# Patient Record
Sex: MALE | Race: BLACK OR AFRICAN AMERICAN | NOT HISPANIC OR LATINO | URBAN - METROPOLITAN AREA
[De-identification: names, ages, dates, MRNs, and addresses within clinical notes are randomized per-mention and may not be internally consistent; named-entity substitution may affect disease eponyms.]

---

## 2024-08-30 ENCOUNTER — OFFICE VISIT (OUTPATIENT)
Dept: URBAN - METROPOLITAN AREA CLINIC 113 | Facility: CLINIC | Age: 74
End: 2024-08-30

## 2024-08-30 ENCOUNTER — DASHBOARD ENCOUNTERS (OUTPATIENT)
Age: 74
End: 2024-08-30

## 2024-08-30 ENCOUNTER — LAB OUTSIDE AN ENCOUNTER (OUTPATIENT)
Dept: URBAN - METROPOLITAN AREA CLINIC 113 | Facility: CLINIC | Age: 74
End: 2024-08-30

## 2024-08-30 VITALS
DIASTOLIC BLOOD PRESSURE: 56 MMHG | TEMPERATURE: 97.7 F | RESPIRATION RATE: 20 BRPM | SYSTOLIC BLOOD PRESSURE: 145 MMHG | WEIGHT: 134.6 LBS | HEIGHT: 71 IN | HEART RATE: 67 BPM | BODY MASS INDEX: 18.84 KG/M2

## 2024-08-30 PROBLEM — 40739000: Status: ACTIVE | Noted: 2024-08-30

## 2024-08-30 RX ORDER — UBIDECARENONE 30 MG
AS DIRECTED CAPSULE ORAL
Status: ACTIVE | COMMUNITY

## 2024-08-30 RX ORDER — TAMSULOSIN HYDROCHLORIDE 0.4 MG/1
1 CAPSULE CAPSULE ORAL ONCE A DAY
Status: ACTIVE | COMMUNITY

## 2024-08-30 RX ORDER — FERROUS SULFATE TAB 325 MG (65 MG ELEMENTAL FE) 325 (65 FE) MG
1 TABLET TAB ORAL
Status: ACTIVE | COMMUNITY

## 2024-08-30 RX ORDER — SIMVASTATIN 40 MG/1
TABLET, FILM COATED ORAL
Refills: 0 | Status: ON HOLD | COMMUNITY
Start: 2013-05-06

## 2024-08-30 RX ORDER — OLMESARTAN MEDOXOMIL 40 MG/1
1 TABLET TABLET, FILM COATED ORAL ONCE A DAY
Status: ACTIVE | COMMUNITY

## 2024-08-30 RX ORDER — AMLODIPINE BESYLATE 10 MG
TAKE 1 TABLET DAILY TABLET ORAL
Refills: 0 | Status: ON HOLD | COMMUNITY

## 2024-08-30 RX ORDER — TRIAMTERENE AND HYDROCHLOROTHIAZIDE 37.5; 25 MG/1; MG/1
TABLET ORAL
Refills: 0 | Status: ON HOLD | COMMUNITY
Start: 2013-10-30

## 2024-08-30 RX ORDER — ASPIRIN 81 MG/1
1 TABLET TABLET, COATED ORAL ONCE A DAY
Status: ACTIVE | COMMUNITY

## 2024-08-30 RX ORDER — FLUTICASONE PROPIONATE 50 UG/1
SPRAY, METERED NASAL
Refills: 0 | Status: ACTIVE | COMMUNITY
Start: 2013-11-08

## 2024-08-30 RX ORDER — CYPROHEPTADINE HYDROCHLORIDE 4 MG/1
1 TABLET TABLET ORAL TWICE A DAY
Status: ACTIVE | COMMUNITY

## 2024-08-30 RX ORDER — CARVEDILOL 12.5 MG/1
1 TABLET WITH FOOD TABLET, FILM COATED ORAL TWICE A DAY
Status: ACTIVE | COMMUNITY

## 2024-08-30 RX ORDER — SIMVASTATIN 20 MG/1
TAKE 1 TABLET DAILY AT BEDTIME TABLET, FILM COATED ORAL
Refills: 0 | Status: ON HOLD | COMMUNITY

## 2024-08-30 RX ORDER — OMEPRAZOLE 20 MG/1
CAPSULE, DELAYED RELEASE ORAL
Refills: 0 | Status: ON HOLD | COMMUNITY
Start: 2013-11-08

## 2024-08-30 RX ORDER — AMLODIPINE BESYLATE 10 MG/1
TABLET ORAL
Refills: 0 | Status: ACTIVE | COMMUNITY
Start: 2013-06-04

## 2024-08-30 RX ORDER — CETIRIZINE HYDROCHLORIDE 10 MG/1
1 TABLET TABLET, FILM COATED ORAL ONCE A DAY
Status: ACTIVE | COMMUNITY

## 2024-08-30 RX ORDER — AZITHROMYCIN DIHYDRATE 250 MG/1
TABLET, FILM COATED ORAL
Refills: 0 | Status: ON HOLD | COMMUNITY
Start: 2013-05-13

## 2024-08-30 RX ORDER — ESOMEPRAZOLE MAGNESIUM 40 MG/1
1 CAPSULE CAPSULE, DELAYED RELEASE PELLETS ORAL ONCE A DAY
Status: ACTIVE | COMMUNITY

## 2024-08-30 RX ORDER — AMOXICILLIN 500 MG/1
CAPSULE ORAL
Refills: 0 | Status: ON HOLD | COMMUNITY
Start: 2013-09-13

## 2024-08-30 RX ORDER — TRIAMTERENE/HYDROCHLOROTHIAZID 37.5-25 MG
TAKE 1 CAPSULE DAILY CAPSULE ORAL
Refills: 0 | Status: ON HOLD | COMMUNITY

## 2024-08-30 RX ORDER — ATORVASTATIN CALCIUM 40 MG/1
1 TABLET TABLET, FILM COATED ORAL ONCE A DAY
Status: ACTIVE | COMMUNITY

## 2024-08-30 RX ORDER — CHOLECALCIFEROL (VITAMIN D3) 50 MCG
1 CAPSULE CAPSULE ORAL ONCE A DAY
Status: ACTIVE | COMMUNITY

## 2024-08-30 RX ORDER — DOCUSATE SODIUM 100 MG/1
1 CAPSULE AS NEEDED CAPSULE ORAL ONCE A DAY
Status: ACTIVE | COMMUNITY

## 2024-08-30 RX ORDER — OMEPRAZOLE 40 MG/1
TAKE 1 CAPSULE DAILY CAPSULE, DELAYED RELEASE ORAL
Qty: 30 | Refills: 4 | Status: ACTIVE | COMMUNITY

## 2024-08-30 RX ORDER — TRIAMTERENE AND HYDROCHLOROTHIAZIDE 37.5; 25 MG/1; MG/1
CAPSULE ORAL
Refills: 0 | Status: ON HOLD | COMMUNITY
Start: 2013-06-04

## 2024-08-30 RX ORDER — NAPROXEN 500 MG/1
TABLET, DELAYED RELEASE ORAL
Refills: 0 | Status: ON HOLD | COMMUNITY
Start: 2013-04-09

## 2024-08-30 NOTE — HPI-TODAY'S VISIT:
This is a 74-year-old male with a history of hypertension, hypercholesterolemia, and dysphagia status post EGD demonstrating normal esophagus/empiric dilation (May 2022) presenting for follow-up regarding difficulty swallowing. He was last seen in hospital consultation 7/31/2022 for evaluation of dysphagia.  He had undergone an upper endoscopy within 2 months which demonstrated no evidence of obstructing lesion.  He had no response to empiric dilation.  It was discussed that dysphagia may be associated with esophageal manometry.  He was withdrawn and affect was markedly blunted.  It was discussed that depression may be playing a role.  Speech therapy evaluation had been ordered.  Dysmotility was a consideration. He was also seen in hospital consultation in May 2022 for dysphagia.  Dr. Aiken was of the opinion there may be a component of dysmotility.  He recommended a barium swallow and esophageal manometry as an outpatient. Labs 8/26/2024:CBC: WBC 4.3, hemoglobin 12.8, MCV 85.6, platelet 216.  BMP normal with exception of potassium 3.3, chloride 93, CO2 35, BUN 5.  LFTs normal TB 1.0, ALP 75, ALT 21, AST 36.  HIV nonreactive. CT soft tissue neck 8/26/2024:No acute findings.  Specifically no significant wall thickening of the cervical and visualized thoracic esophagus.  No extraluminal gas within the visualized mediastinum. Colonoscopy 4/8/2024 (Dr. Fields):Fair prep, sigmoid diverticulosis, internal hemorrhoids, no specimens collected. CT angiogram aorta and by iliofemoral 1/12/2024:No evidence of significant arterial stenosis or occlusion involving the bilateral lower extremity arterial systems.  Exam limited.  Excessive subcutaneous edema involving the bilateral lower  extremities.  But there is flow distal to this region.  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  Findings likely reflect an infectious/inflammatory etiology.  Bilateral right greater than left inguinal hernias containing multiple loops of nonobstructed bowel.  Small hiatal hernia. He had a right inguinal hernia repair 6/27/2024. He is a poor historian.  He is frequently unable to provide details to ask questions. He continues to experience difficulty swallowing describing food lodging in his throat/base of his throat relieved with drinking water.  He denies heartburn or nausea. He reports a recent onset of diarrhea.  He admits to loose or watery bowel movements occurring during the day and at night.  He denies abdominal pain but reports "gripping" preceding a bowel movement.  He denies red blood per rectum or melena.  He is unable to relay the duration of symptoms or frequency of bowel movements.  He states he is unable to eat because of difficulty swallowing.  He is drinking soda frequently. Comparing his current weight with his weight at his primary care physician's office in January,

## 2024-10-20 ENCOUNTER — TELEPHONE ENCOUNTER (OUTPATIENT)
Dept: URBAN - METROPOLITAN AREA CLINIC 113 | Facility: CLINIC | Age: 74
End: 2024-10-20

## 2024-10-21 ENCOUNTER — LAB OUTSIDE AN ENCOUNTER (OUTPATIENT)
Dept: URBAN - METROPOLITAN AREA CLINIC 113 | Facility: CLINIC | Age: 74
End: 2024-10-21

## 2024-10-21 ENCOUNTER — OFFICE VISIT (OUTPATIENT)
Dept: URBAN - METROPOLITAN AREA CLINIC 113 | Facility: CLINIC | Age: 74
End: 2024-10-21
Payer: MEDICARE

## 2024-10-21 VITALS
BODY MASS INDEX: 18.56 KG/M2 | RESPIRATION RATE: 16 BRPM | HEART RATE: 88 BPM | DIASTOLIC BLOOD PRESSURE: 50 MMHG | TEMPERATURE: 97.9 F | SYSTOLIC BLOOD PRESSURE: 136 MMHG | HEIGHT: 71 IN | WEIGHT: 132.6 LBS

## 2024-10-21 DIAGNOSIS — R93.3 ABNORMAL BARIUM SWALLOW: ICD-10-CM

## 2024-10-21 DIAGNOSIS — R19.7 DIARRHEA, UNSPECIFIED TYPE: ICD-10-CM

## 2024-10-21 DIAGNOSIS — R13.14 PHARYNGOESOPHAGEAL DYSPHAGIA: ICD-10-CM

## 2024-10-21 DIAGNOSIS — T17.900A SILENT ASPIRATION, INITIAL ENCOUNTER: ICD-10-CM

## 2024-10-21 PROBLEM — 413585005: Status: ACTIVE | Noted: 2024-10-21

## 2024-10-21 PROBLEM — 168824004: Status: ACTIVE | Noted: 2024-10-21

## 2024-10-21 PROCEDURE — 99214 OFFICE O/P EST MOD 30 MIN: CPT | Performed by: NURSE PRACTITIONER

## 2024-10-21 RX ORDER — DOCUSATE SODIUM 100 MG/1
1 CAPSULE AS NEEDED CAPSULE ORAL ONCE A DAY
Status: ACTIVE | COMMUNITY

## 2024-10-21 RX ORDER — TRIAMTERENE/HYDROCHLOROTHIAZID 37.5-25 MG
TAKE 1 CAPSULE DAILY CAPSULE ORAL
Refills: 0 | Status: ON HOLD | COMMUNITY

## 2024-10-21 RX ORDER — NAPROXEN 500 MG/1
TABLET, DELAYED RELEASE ORAL
Refills: 0 | Status: ON HOLD | COMMUNITY
Start: 2013-04-09

## 2024-10-21 RX ORDER — TRIAMTERENE AND HYDROCHLOROTHIAZIDE 37.5; 25 MG/1; MG/1
TABLET ORAL
Refills: 0 | Status: ON HOLD | COMMUNITY
Start: 2013-10-30

## 2024-10-21 RX ORDER — AZITHROMYCIN DIHYDRATE 250 MG/1
TABLET, FILM COATED ORAL
Refills: 0 | Status: ON HOLD | COMMUNITY
Start: 2013-05-13

## 2024-10-21 RX ORDER — FLUTICASONE PROPIONATE 50 UG/1
SPRAY, METERED NASAL
Refills: 0 | Status: ACTIVE | COMMUNITY
Start: 2013-11-08

## 2024-10-21 RX ORDER — FERROUS SULFATE TAB 325 MG (65 MG ELEMENTAL FE) 325 (65 FE) MG
1 TABLET TAB ORAL
Status: ACTIVE | COMMUNITY

## 2024-10-21 RX ORDER — ASPIRIN 81 MG/1
1 TABLET TABLET, COATED ORAL ONCE A DAY
Status: ACTIVE | COMMUNITY

## 2024-10-21 RX ORDER — CARVEDILOL 12.5 MG/1
1 TABLET WITH FOOD TABLET, FILM COATED ORAL TWICE A DAY
Status: ACTIVE | COMMUNITY

## 2024-10-21 RX ORDER — TRIAMTERENE AND HYDROCHLOROTHIAZIDE 37.5; 25 MG/1; MG/1
CAPSULE ORAL
Refills: 0 | Status: ON HOLD | COMMUNITY
Start: 2013-06-04

## 2024-10-21 RX ORDER — CHOLECALCIFEROL (VITAMIN D3) 50 MCG
1 CAPSULE CAPSULE ORAL ONCE A DAY
Status: ACTIVE | COMMUNITY

## 2024-10-21 RX ORDER — OLMESARTAN MEDOXOMIL 40 MG/1
1 TABLET TABLET, FILM COATED ORAL ONCE A DAY
Status: ACTIVE | COMMUNITY

## 2024-10-21 RX ORDER — CETIRIZINE HYDROCHLORIDE 10 MG/1
1 TABLET TABLET, FILM COATED ORAL ONCE A DAY
Status: ACTIVE | COMMUNITY

## 2024-10-21 RX ORDER — ATORVASTATIN CALCIUM 40 MG/1
1 TABLET TABLET, FILM COATED ORAL ONCE A DAY
Status: ACTIVE | COMMUNITY

## 2024-10-21 RX ORDER — AMOXICILLIN 500 MG/1
CAPSULE ORAL
Refills: 0 | Status: ON HOLD | COMMUNITY
Start: 2013-09-13

## 2024-10-21 RX ORDER — OMEPRAZOLE 20 MG/1
CAPSULE, DELAYED RELEASE ORAL
Refills: 0 | Status: ON HOLD | COMMUNITY
Start: 2013-11-08

## 2024-10-21 RX ORDER — UBIDECARENONE 30 MG
AS DIRECTED CAPSULE ORAL
Status: ACTIVE | COMMUNITY

## 2024-10-21 RX ORDER — CYPROHEPTADINE HYDROCHLORIDE 4 MG/1
1 TABLET TABLET ORAL TWICE A DAY
Status: ACTIVE | COMMUNITY

## 2024-10-21 RX ORDER — TAMSULOSIN HYDROCHLORIDE 0.4 MG/1
1 CAPSULE CAPSULE ORAL ONCE A DAY
Status: ACTIVE | COMMUNITY

## 2024-10-21 RX ORDER — AMLODIPINE BESYLATE 10 MG
TAKE 1 TABLET DAILY TABLET ORAL
Refills: 0 | Status: ON HOLD | COMMUNITY

## 2024-10-21 RX ORDER — OMEPRAZOLE 40 MG/1
TAKE 1 CAPSULE DAILY CAPSULE, DELAYED RELEASE ORAL
Qty: 30 | Refills: 4 | Status: ACTIVE | COMMUNITY

## 2024-10-21 RX ORDER — AMLODIPINE BESYLATE 10 MG/1
TABLET ORAL
Refills: 0 | Status: ACTIVE | COMMUNITY
Start: 2013-06-04

## 2024-10-21 RX ORDER — ESOMEPRAZOLE MAGNESIUM 40 MG/1
1 CAPSULE CAPSULE, DELAYED RELEASE PELLETS ORAL ONCE A DAY
Status: ACTIVE | COMMUNITY

## 2024-10-21 RX ORDER — SIMVASTATIN 20 MG/1
TAKE 1 TABLET DAILY AT BEDTIME TABLET, FILM COATED ORAL
Refills: 0 | Status: ON HOLD | COMMUNITY

## 2024-10-21 RX ORDER — SIMVASTATIN 40 MG/1
TABLET, FILM COATED ORAL
Refills: 0 | Status: ON HOLD | COMMUNITY
Start: 2013-05-06

## 2024-10-21 NOTE — HPI-OTHER HISTORIES
Barium swallow 9/9/2024:Exam terminated prematurely due to patient silent aspiration. The cursory evaluation of the cervical and thoracic esophagus demonstrated no gross abnormalities. (Patulous esophagus without obvious contouring thoracic esophagus). No hiatal hernia. 13 mm barium pill passed into the stomach without difficulty.  Labs  8/26/2024:CBC: WBC 4.3, hemoglobin 12.8, MCV 85.6, platelet 216. BMP normal with exception of potassium 3.3, chloride 93, CO2 35, BUN 5. LFTs normal TB 1.0, ALP 75, ALT 21, AST 36. HIV nonreactive.  CT soft tissue neck 8/26/2024:No acute findings. Specifically no significant wall thickening of the cervical and visualized thoracic esophagus. No extraluminal gas within the visualized mediastinum.  Colonoscopy 4/8/2024 (Dr. Fields):Fair prep, sigmoid diverticulosis, internal hemorrhoids, no specimens collected.  CT angiogram aorta and by iliofemoral 1/12/2024:No evidence of significant arterial stenosis or occlusion involving the bilateral lower extremity arterial systems. Exam limited. Excessive subcutaneous edema involving the bilateral lower extremities. But there is flow distal to this region. 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 Findings likely reflect an infectious/inflammatory etiology. Bilateral right greater than left inguinal hernias containing multiple loops of nonobstructed bowel. Small hiatal hernia.

## 2024-10-21 NOTE — HPI-TODAY'S VISIT:
This is a 74-year-old male with a history of hypertension, hypercholesterolemia, dysphagia status post EGD demonstrating normal esophagus/empiric dilation (2022), and diarrhea presenting for follow-up.  He reported chronic pharyngeal dysphagia.  He had experienced persistent symptoms since empiric dilation in 2022.  He was seen twice in hospital consultation shortly after his EGD.  In the hospital, he was able to eat and drink.  It was discussed that he may have a motility disorder of the esophagus.  Barium swallow and esophageal manometry were recommended as an outpatient.  Barium swallow was ordered and esophageal manometry was a future consideration.  He reported diarrhea.  Symptoms appear to be acute although he could not relate the duration of his symptoms.  Stool studies were recommended to assess for an infectious process.  In the interim, he was to begin daily fiber and use Pepto-Bismol as needed.  Imodium was a consideration if stool studies returned negative.  If symptoms persisted, repeat colonoscopy was a consideration. There were no results from stool studies and no results from barium swallow.  He continues to report difficulty swallowing.  He has problems swallowing larger pills.  He feels as though material is lodging in his throat.  He is taking esomeprazole or omeprazole 40 mg daily.  1 of these capsules is smaller and he is able to swallow it.  He denies heartburn, regurgitation, or abdominal pain.  He continues to report more than 2 watery bowel movements per day.  He has intermittently taking 1 sleeve of Benefiber daily.  He denies red blood per rectum or other abdominal symptoms. He reports his throat feels swollen and tight.  He also feels as though he may have a fever. He is lost 2 pounds since his last visit.  He is drinking 2 or 3 cans of boost daily.

## 2024-12-09 ENCOUNTER — OFFICE VISIT (OUTPATIENT)
Dept: URBAN - METROPOLITAN AREA CLINIC 113 | Facility: CLINIC | Age: 74
End: 2024-12-09

## 2024-12-09 RX ORDER — TRIAMTERENE/HYDROCHLOROTHIAZID 37.5-25 MG
TAKE 1 CAPSULE DAILY CAPSULE ORAL
Refills: 0 | Status: ON HOLD | COMMUNITY

## 2024-12-09 RX ORDER — OLMESARTAN MEDOXOMIL 40 MG/1
1 TABLET TABLET, FILM COATED ORAL ONCE A DAY
Status: ACTIVE | COMMUNITY

## 2024-12-09 RX ORDER — SIMVASTATIN 20 MG/1
TAKE 1 TABLET DAILY AT BEDTIME TABLET, FILM COATED ORAL
Refills: 0 | Status: ON HOLD | COMMUNITY

## 2024-12-09 RX ORDER — TAMSULOSIN HYDROCHLORIDE 0.4 MG/1
1 CAPSULE CAPSULE ORAL ONCE A DAY
Status: ACTIVE | COMMUNITY

## 2024-12-09 RX ORDER — ESOMEPRAZOLE MAGNESIUM 40 MG/1
1 CAPSULE CAPSULE, DELAYED RELEASE PELLETS ORAL ONCE A DAY
Status: ACTIVE | COMMUNITY

## 2024-12-09 RX ORDER — FERROUS SULFATE TAB 325 MG (65 MG ELEMENTAL FE) 325 (65 FE) MG
1 TABLET TAB ORAL
Status: ACTIVE | COMMUNITY

## 2024-12-09 RX ORDER — ASPIRIN 81 MG/1
1 TABLET TABLET, COATED ORAL ONCE A DAY
Status: ACTIVE | COMMUNITY

## 2024-12-09 RX ORDER — DOCUSATE SODIUM 100 MG/1
1 CAPSULE AS NEEDED CAPSULE ORAL ONCE A DAY
Status: ACTIVE | COMMUNITY

## 2024-12-09 RX ORDER — AMLODIPINE BESYLATE 10 MG/1
TABLET ORAL
Refills: 0 | Status: ACTIVE | COMMUNITY
Start: 2013-06-04

## 2024-12-09 RX ORDER — NAPROXEN 500 MG/1
TABLET, DELAYED RELEASE ORAL
Refills: 0 | Status: ON HOLD | COMMUNITY
Start: 2013-04-09

## 2024-12-09 RX ORDER — AZITHROMYCIN DIHYDRATE 250 MG/1
TABLET, FILM COATED ORAL
Refills: 0 | Status: ON HOLD | COMMUNITY
Start: 2013-05-13

## 2024-12-09 RX ORDER — OMEPRAZOLE 20 MG/1
CAPSULE, DELAYED RELEASE ORAL
Refills: 0 | Status: ON HOLD | COMMUNITY
Start: 2013-11-08

## 2024-12-09 RX ORDER — CARVEDILOL 12.5 MG/1
1 TABLET WITH FOOD TABLET, FILM COATED ORAL TWICE A DAY
Status: ACTIVE | COMMUNITY

## 2024-12-09 RX ORDER — OMEPRAZOLE 40 MG/1
TAKE 1 CAPSULE DAILY CAPSULE, DELAYED RELEASE ORAL
Qty: 30 | Refills: 4 | Status: ACTIVE | COMMUNITY

## 2024-12-09 RX ORDER — ATORVASTATIN CALCIUM 40 MG/1
1 TABLET TABLET, FILM COATED ORAL ONCE A DAY
Status: ACTIVE | COMMUNITY

## 2024-12-09 RX ORDER — TRIAMTERENE AND HYDROCHLOROTHIAZIDE 37.5; 25 MG/1; MG/1
TABLET ORAL
Refills: 0 | Status: ON HOLD | COMMUNITY
Start: 2013-10-30

## 2024-12-09 RX ORDER — AMOXICILLIN 500 MG/1
CAPSULE ORAL
Refills: 0 | Status: ON HOLD | COMMUNITY
Start: 2013-09-13

## 2024-12-09 RX ORDER — UBIDECARENONE 30 MG
AS DIRECTED CAPSULE ORAL
Status: ACTIVE | COMMUNITY

## 2024-12-09 RX ORDER — CETIRIZINE HYDROCHLORIDE 10 MG/1
1 TABLET TABLET, FILM COATED ORAL ONCE A DAY
Status: ACTIVE | COMMUNITY

## 2024-12-09 RX ORDER — AMLODIPINE BESYLATE 10 MG
TAKE 1 TABLET DAILY TABLET ORAL
Refills: 0 | Status: ON HOLD | COMMUNITY

## 2024-12-09 RX ORDER — SIMVASTATIN 40 MG/1
TABLET, FILM COATED ORAL
Refills: 0 | Status: ON HOLD | COMMUNITY
Start: 2013-05-06

## 2024-12-09 RX ORDER — TRIAMTERENE AND HYDROCHLOROTHIAZIDE 37.5; 25 MG/1; MG/1
CAPSULE ORAL
Refills: 0 | Status: ON HOLD | COMMUNITY
Start: 2013-06-04

## 2024-12-09 RX ORDER — CHOLECALCIFEROL (VITAMIN D3) 50 MCG
1 CAPSULE CAPSULE ORAL ONCE A DAY
Status: ACTIVE | COMMUNITY

## 2024-12-09 RX ORDER — CYPROHEPTADINE HYDROCHLORIDE 4 MG/1
1 TABLET TABLET ORAL TWICE A DAY
Status: ACTIVE | COMMUNITY

## 2024-12-09 RX ORDER — FLUTICASONE PROPIONATE 50 UG/1
SPRAY, METERED NASAL
Refills: 0 | Status: ACTIVE | COMMUNITY
Start: 2013-11-08

## 2024-12-09 NOTE — HPI-TODAY'S VISIT:
This is a 74-year-old male with a history of hypertension, hypercholesterolemia, dysphagia status post EGD demonstrating normal esophagus/empiric dilation (2022), and diarrhea presenting for follow-up. He was last seen 10/21/2024.  He was taking esomeprazole 40 mg or omeprazole 40 mg daily for acid reflux.  He denied heartburn or regurgitation.  He continues to report pharyngeal dysphagia.  Barium swallow had been aborted prior to completion showing silent aspiration.  A barium tablet passed through the esophagus without incident and there was mention of a patulous thoracic esophagus.  Discussed with Dr. Aiken who recommended a modified barium swallow with speech to assess for aspiration and esophageal manometry.  He continues to report more than 2 watery bowel movements per day.  He had been taking 1 sleeve of Benefiber intermittently.  At a previous visit, stool studies were ordered but had not been completed.  He was to complete stool studies and increase Benefiber to 2 sleeves every day. Labs 11/17/2024:CBC: WBC 3.6, hemoglobin 13.2, MCV 84.6, platelet 245.  BMP normal with exception of glucose 73, sodium 133, chloride 91.22, CO2 38, BUN 22.  LFTs normal TB 0.6, ALP 90, ALT 24, AST 30.  PT 12.7, INR 1.13.  PTT 42.6.  proBNP 244.  Magnesium, phosphorus, and troponin normal/negative. There are no results for esophageal manometry or modified barium swallow.  It does not appear as though orders have been faxed.

## 2025-01-16 ENCOUNTER — OFFICE VISIT (OUTPATIENT)
Dept: URBAN - METROPOLITAN AREA CLINIC 113 | Facility: CLINIC | Age: 75
End: 2025-01-16
Payer: MEDICARE

## 2025-01-16 VITALS
TEMPERATURE: 97.5 F | WEIGHT: 140 LBS | DIASTOLIC BLOOD PRESSURE: 46 MMHG | BODY MASS INDEX: 19.6 KG/M2 | SYSTOLIC BLOOD PRESSURE: 144 MMHG | HEART RATE: 83 BPM | HEIGHT: 71 IN

## 2025-01-16 DIAGNOSIS — K21.9 GERD WITHOUT ESOPHAGITIS: ICD-10-CM

## 2025-01-16 DIAGNOSIS — T17.900A SILENT ASPIRATION, INITIAL ENCOUNTER: ICD-10-CM

## 2025-01-16 DIAGNOSIS — R93.3 ABNORMAL BARIUM SWALLOW: ICD-10-CM

## 2025-01-16 DIAGNOSIS — R13.14 PHARYNGOESOPHAGEAL DYSPHAGIA: ICD-10-CM

## 2025-01-16 PROCEDURE — 99213 OFFICE O/P EST LOW 20 MIN: CPT | Performed by: NURSE PRACTITIONER

## 2025-01-16 RX ORDER — TRIAMTERENE/HYDROCHLOROTHIAZID 37.5-25 MG
TAKE 1 CAPSULE DAILY CAPSULE ORAL
Refills: 0 | Status: ON HOLD | COMMUNITY

## 2025-01-16 RX ORDER — FERROUS SULFATE TAB 325 MG (65 MG ELEMENTAL FE) 325 (65 FE) MG
1 TABLET TAB ORAL
Status: ACTIVE | COMMUNITY

## 2025-01-16 RX ORDER — OMEPRAZOLE 20 MG/1
CAPSULE, DELAYED RELEASE ORAL
Refills: 0 | Status: ON HOLD | COMMUNITY
Start: 2013-11-08

## 2025-01-16 RX ORDER — AZITHROMYCIN DIHYDRATE 250 MG/1
TABLET, FILM COATED ORAL
Refills: 0 | Status: ON HOLD | COMMUNITY
Start: 2013-05-13

## 2025-01-16 RX ORDER — ESOMEPRAZOLE MAGNESIUM 40 MG/1
1 CAPSULE CAPSULE, DELAYED RELEASE PELLETS ORAL ONCE A DAY
Status: ON HOLD | COMMUNITY

## 2025-01-16 RX ORDER — TRIAMTERENE AND HYDROCHLOROTHIAZIDE 37.5; 25 MG/1; MG/1
CAPSULE ORAL
Refills: 0 | Status: ON HOLD | COMMUNITY
Start: 2013-06-04

## 2025-01-16 RX ORDER — ASPIRIN 81 MG/1
1 TABLET TABLET, COATED ORAL ONCE A DAY
Status: ACTIVE | COMMUNITY

## 2025-01-16 RX ORDER — NAPROXEN 500 MG/1
TABLET, DELAYED RELEASE ORAL
Refills: 0 | Status: ON HOLD | COMMUNITY
Start: 2013-04-09

## 2025-01-16 RX ORDER — OMEPRAZOLE 40 MG/1
TAKE 1 CAPSULE DAILY CAPSULE, DELAYED RELEASE ORAL
Qty: 30 | Refills: 4 | Status: ACTIVE | COMMUNITY

## 2025-01-16 RX ORDER — ATORVASTATIN CALCIUM 40 MG/1
1 TABLET TABLET, FILM COATED ORAL ONCE A DAY
Status: ACTIVE | COMMUNITY

## 2025-01-16 RX ORDER — CHOLECALCIFEROL (VITAMIN D3) 50 MCG
1 CAPSULE CAPSULE ORAL ONCE A DAY
Status: ACTIVE | COMMUNITY

## 2025-01-16 RX ORDER — FLUTICASONE PROPIONATE 50 UG/1
SPRAY, METERED NASAL
Refills: 0 | Status: ACTIVE | COMMUNITY
Start: 2013-11-08

## 2025-01-16 RX ORDER — AMLODIPINE BESYLATE 10 MG/1
TABLET ORAL
Refills: 0 | Status: ACTIVE | COMMUNITY
Start: 2013-06-04

## 2025-01-16 RX ORDER — AMOXICILLIN 500 MG/1
CAPSULE ORAL
Refills: 0 | Status: ON HOLD | COMMUNITY
Start: 2013-09-13

## 2025-01-16 RX ORDER — CETIRIZINE HYDROCHLORIDE 10 MG/1
1 TABLET TABLET, FILM COATED ORAL ONCE A DAY
Status: ACTIVE | COMMUNITY

## 2025-01-16 RX ORDER — DOCUSATE SODIUM 100 MG/1
1 CAPSULE AS NEEDED CAPSULE ORAL ONCE A DAY
Status: ACTIVE | COMMUNITY

## 2025-01-16 RX ORDER — AMLODIPINE BESYLATE 10 MG
TAKE 1 TABLET DAILY TABLET ORAL
Refills: 0 | Status: ON HOLD | COMMUNITY

## 2025-01-16 RX ORDER — UBIDECARENONE 30 MG
AS DIRECTED CAPSULE ORAL
Status: ACTIVE | COMMUNITY

## 2025-01-16 RX ORDER — CARVEDILOL 12.5 MG/1
1 TABLET WITH FOOD TABLET, FILM COATED ORAL TWICE A DAY
Status: ACTIVE | COMMUNITY

## 2025-01-16 RX ORDER — OLMESARTAN MEDOXOMIL 40 MG/1
1 TABLET TABLET, FILM COATED ORAL ONCE A DAY
Status: ACTIVE | COMMUNITY

## 2025-01-16 RX ORDER — TRIAMTERENE AND HYDROCHLOROTHIAZIDE 37.5; 25 MG/1; MG/1
TABLET ORAL
Refills: 0 | Status: ON HOLD | COMMUNITY
Start: 2013-10-30

## 2025-01-16 RX ORDER — TAMSULOSIN HYDROCHLORIDE 0.4 MG/1
1 CAPSULE CAPSULE ORAL ONCE A DAY
Status: ACTIVE | COMMUNITY

## 2025-01-16 RX ORDER — SIMVASTATIN 40 MG/1
TABLET, FILM COATED ORAL
Refills: 0 | Status: ON HOLD | COMMUNITY
Start: 2013-05-06

## 2025-01-16 RX ORDER — CYPROHEPTADINE HYDROCHLORIDE 4 MG/1
1 TABLET TABLET ORAL TWICE A DAY
Status: ACTIVE | COMMUNITY

## 2025-01-16 RX ORDER — SIMVASTATIN 20 MG/1
TAKE 1 TABLET DAILY AT BEDTIME TABLET, FILM COATED ORAL
Refills: 0 | Status: ON HOLD | COMMUNITY

## 2025-01-16 NOTE — PHYSICAL EXAM EYES:
Conjunctivae and eyelids appear normal,  Sclerae : no icterus Cerebrovascular accident (CVA) due to stenosis of left middle cerebral artery    Controlled type 2 diabetes mellitus without complication, unspecified long term insulin use status    Coronary artery disease without angina pectoris, unspecified vessel or lesion type, unspecified whether native or transplanted heart    Depression, unspecified depression type    Essential hypertension    High blood cholesterol

## 2025-01-16 NOTE — HPI-OTHER HISTORIES
Labs 11/17/2024:CBC: WBC 3.6, hemoglobin 13.2, MCV 84.6, platelet 245. BMP normal with exception of glucose 73, sodium 133, chloride 91.22, CO2 38, BUN 22. LFTs normal TB 0.6, ALP 90, ALT 24, AST 30. PT 12.7, INR 1.13. PTT 42.6. proBNP 244. Magnesium, phosphorus, and troponin normal/negative.  Barium swallow 9/9/2024:Exam terminated prematurely due to patient silent aspiration. The cursory evaluation of the cervical and thoracic esophagus demonstrated no gross abnormalities. (Patulous esophagus without obvious contouring thoracic esophagus). No hiatal hernia. 13 mm barium pill passed into the stomach without difficulty.  Labs  8/26/2024:CBC: WBC 4.3, hemoglobin 12.8, MCV 85.6, platelet 216. BMP normal with exception of potassium 3.3, chloride 93, CO2 35, BUN 5. LFTs normal TB 1.0, ALP 75, ALT 21, AST 36. HIV nonreactive.  CT soft tissue neck 8/26/2024:No acute findings. Specifically no significant wall thickening of the cervical and visualized thoracic esophagus. No extraluminal gas within the visualized mediastinum.  Colonoscopy 4/8/2024 (Dr. Fields):Fair prep, sigmoid diverticulosis, internal hemorrhoids, no specimens collected.  CT angiogram aorta and by iliofemoral 1/12/2024:No evidence of significant arterial stenosis or occlusion involving the bilateral lower extremity arterial systems. Exam limited. Excessive subcutaneous edema involving the bilateral lower extremities. But there is flow distal to this region. 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 Findings likely reflect an infectious/inflammatory etiology. Bilateral right greater than left inguinal hernias containing multiple loops of nonobstructed bowel. Small hiatal hernia.

## 2025-01-16 NOTE — HPI-TODAY'S VISIT:
This is a 74-year-old male with a history of hypertension, hypercholesterolemia, dysphagia status post EGD demonstrating normal esophagus/empiric dilation (2022), and diarrhea presenting for follow-up. He was last seen 10/21/2024.  He was taking esomeprazole 40 mg or omeprazole 40 mg daily for acid reflux.  He denied heartburn or regurgitation.  He continues to report pharyngeal dysphagia.  Barium swallow had been aborted prior to completion showing silent aspiration.  A barium tablet passed through the esophagus without incident and there was mention of a patulous thoracic esophagus.  Discussed with Dr. Aiken who recommended a modified barium swallow with speech to assess for aspiration and esophageal manometry.  He continues to report more than 2 watery bowel movements per day.  He had been taking 1 sleeve of Benefiber intermittently.  At a previous visit, stool studies were ordered but had not been completed.  He was to complete stool studies and increase Benefiber to 2 sleeves every day. There are no results for esophageal manometry or modified barium swallow.  It does not appear as though orders have been faxed. He reports persistent difficulty swallowing describing solid food lodging in the area at the base of his neck.  He denies choking or coughing when swallowing.  He is not eating food but is drinking Ensure.  He denies heartburn, abdominal pain, or nausea.  He has a bowel movement most days.  He occasionally requires Colace for constipation.  He denies other abdominal symptoms.  He has gained 7-1/2 pounds since his last visit. His great niece is accompanying him.  She assists with his history and reports recent labs showing diabetes and kidney disease.

## 2025-01-20 PROBLEM — 266435005: Status: ACTIVE | Noted: 2025-01-20

## 2025-03-25 ENCOUNTER — TELEPHONE ENCOUNTER (OUTPATIENT)
Dept: URBAN - METROPOLITAN AREA CLINIC 5 | Facility: CLINIC | Age: 75
End: 2025-03-25

## 2025-04-03 ENCOUNTER — OFFICE VISIT (OUTPATIENT)
Dept: URBAN - METROPOLITAN AREA CLINIC 113 | Facility: CLINIC | Age: 75
End: 2025-04-03

## 2025-04-03 RX ORDER — OMEPRAZOLE 40 MG/1
TAKE 1 CAPSULE DAILY CAPSULE, DELAYED RELEASE ORAL
Qty: 30 | Refills: 4 | Status: ACTIVE | COMMUNITY

## 2025-04-03 RX ORDER — ATORVASTATIN CALCIUM 40 MG/1
1 TABLET TABLET, FILM COATED ORAL ONCE A DAY
Status: ACTIVE | COMMUNITY

## 2025-04-03 RX ORDER — AZITHROMYCIN DIHYDRATE 250 MG/1
TABLET, FILM COATED ORAL
Refills: 0 | Status: ON HOLD | COMMUNITY
Start: 2013-05-13

## 2025-04-03 RX ORDER — TRIAMTERENE AND HYDROCHLOROTHIAZIDE 37.5; 25 MG/1; MG/1
TABLET ORAL
Refills: 0 | Status: ON HOLD | COMMUNITY
Start: 2013-10-30

## 2025-04-03 RX ORDER — SIMVASTATIN 20 MG/1
TAKE 1 TABLET DAILY AT BEDTIME TABLET, FILM COATED ORAL
Refills: 0 | Status: ON HOLD | COMMUNITY

## 2025-04-03 RX ORDER — CYPROHEPTADINE HYDROCHLORIDE 4 MG/1
1 TABLET TABLET ORAL TWICE A DAY
Status: ACTIVE | COMMUNITY

## 2025-04-03 RX ORDER — SIMVASTATIN 40 MG/1
TABLET, FILM COATED ORAL
Refills: 0 | Status: ON HOLD | COMMUNITY
Start: 2013-05-06

## 2025-04-03 RX ORDER — OMEPRAZOLE 20 MG/1
CAPSULE, DELAYED RELEASE ORAL
Refills: 0 | Status: ON HOLD | COMMUNITY
Start: 2013-11-08

## 2025-04-03 RX ORDER — CETIRIZINE HYDROCHLORIDE 10 MG/1
1 TABLET TABLET, FILM COATED ORAL ONCE A DAY
Status: ACTIVE | COMMUNITY

## 2025-04-03 RX ORDER — UBIDECARENONE 30 MG
AS DIRECTED CAPSULE ORAL
Status: ACTIVE | COMMUNITY

## 2025-04-03 RX ORDER — CHOLECALCIFEROL (VITAMIN D3) 50 MCG
1 CAPSULE CAPSULE ORAL ONCE A DAY
Status: ACTIVE | COMMUNITY

## 2025-04-03 RX ORDER — AMLODIPINE BESYLATE 10 MG/1
TABLET ORAL
Refills: 0 | Status: ACTIVE | COMMUNITY
Start: 2013-06-04

## 2025-04-03 RX ORDER — OLMESARTAN MEDOXOMIL 40 MG/1
1 TABLET TABLET, FILM COATED ORAL ONCE A DAY
Status: ACTIVE | COMMUNITY

## 2025-04-03 RX ORDER — TRIAMTERENE/HYDROCHLOROTHIAZID 37.5-25 MG
TAKE 1 CAPSULE DAILY CAPSULE ORAL
Refills: 0 | Status: ON HOLD | COMMUNITY

## 2025-04-03 RX ORDER — TRIAMTERENE AND HYDROCHLOROTHIAZIDE 37.5; 25 MG/1; MG/1
CAPSULE ORAL
Refills: 0 | Status: ON HOLD | COMMUNITY
Start: 2013-06-04

## 2025-04-03 RX ORDER — ASPIRIN 81 MG/1
1 TABLET TABLET, COATED ORAL ONCE A DAY
Status: ACTIVE | COMMUNITY

## 2025-04-03 RX ORDER — AMOXICILLIN 500 MG/1
CAPSULE ORAL
Refills: 0 | Status: ON HOLD | COMMUNITY
Start: 2013-09-13

## 2025-04-03 RX ORDER — AMLODIPINE BESYLATE 10 MG
TAKE 1 TABLET DAILY TABLET ORAL
Refills: 0 | Status: ON HOLD | COMMUNITY

## 2025-04-03 RX ORDER — TAMSULOSIN HYDROCHLORIDE 0.4 MG/1
1 CAPSULE CAPSULE ORAL ONCE A DAY
Status: ACTIVE | COMMUNITY

## 2025-04-03 RX ORDER — ESOMEPRAZOLE MAGNESIUM 40 MG/1
1 CAPSULE CAPSULE, DELAYED RELEASE PELLETS ORAL ONCE A DAY
Status: ON HOLD | COMMUNITY

## 2025-04-03 RX ORDER — FERROUS SULFATE TAB 325 MG (65 MG ELEMENTAL FE) 325 (65 FE) MG
1 TABLET TAB ORAL
Status: ACTIVE | COMMUNITY

## 2025-04-03 RX ORDER — FLUTICASONE PROPIONATE 50 UG/1
SPRAY, METERED NASAL
Refills: 0 | Status: ACTIVE | COMMUNITY
Start: 2013-11-08

## 2025-04-03 RX ORDER — CARVEDILOL 12.5 MG/1
1 TABLET WITH FOOD TABLET, FILM COATED ORAL TWICE A DAY
Status: ACTIVE | COMMUNITY

## 2025-04-03 RX ORDER — NAPROXEN 500 MG/1
TABLET, DELAYED RELEASE ORAL
Refills: 0 | Status: ON HOLD | COMMUNITY
Start: 2013-04-09

## 2025-04-03 RX ORDER — DOCUSATE SODIUM 100 MG/1
1 CAPSULE AS NEEDED CAPSULE ORAL ONCE A DAY
Status: ACTIVE | COMMUNITY

## 2025-04-03 NOTE — HPI-TODAY'S VISIT:
This is a 74-year-old male with a history of hypertension, hypercholesterolemia, dysphagia status post EGD demonstrating normal esophagus/empiric dilation (2022), and diarrhea presenting for follow-up.  He was last seen here on 1/16/25.  He was last seen 10/21/2024.  He was taking esomeprazole 40 mg or omeprazole 40 mg daily for acid reflux.  He denied heartburn or regurgitation.  He continues to report pharyngeal dysphagia.  Barium swallow had been aborted prior to completion showing silent aspiration.  A barium tablet passed through the esophagus without incident and there was mention of a patulous thoracic esophagus.  Discussed with Dr. Aiken who recommended a modified barium swallow with speech to assess for aspiration and esophageal manometry.  He continues to report more than 2 watery bowel movements per day.  He had been taking 1 sleeve of Benefiber intermittently.  At a previous visit, stool studies were ordered but had not been completed.  He was to complete stool studies and increase Benefiber to 2 sleeves every day. There are no results for esophageal manometry or modified barium swallow.  It does not appear as though orders have been faxed.  He reports persistent difficulty swallowing describing solid food lodging in the area at the base of his neck.  He denies choking or coughing when swallowing.  He is not eating food but is drinking Ensure.  He denies heartburn, abdominal pain, or nausea.  He has a bowel movement most days.  He occasionally requires Colace for constipation.  He denies other abdominal symptoms.  He has gained 7-1/2 pounds since his last visit.  His great niece is accompanying him.  She assists with his history and reports recent labs showing diabetes and kidney disease.

## 2025-06-11 ENCOUNTER — LAB OUTSIDE AN ENCOUNTER (OUTPATIENT)
Dept: URBAN - METROPOLITAN AREA CLINIC 113 | Facility: CLINIC | Age: 75
End: 2025-06-11

## 2025-06-11 ENCOUNTER — TELEPHONE ENCOUNTER (OUTPATIENT)
Dept: URBAN - METROPOLITAN AREA CLINIC 113 | Facility: CLINIC | Age: 75
End: 2025-06-11

## 2025-06-11 ENCOUNTER — OFFICE VISIT (OUTPATIENT)
Dept: URBAN - METROPOLITAN AREA CLINIC 113 | Facility: CLINIC | Age: 75
End: 2025-06-11
Payer: MEDICARE

## 2025-06-11 DIAGNOSIS — K21.9 GERD WITHOUT ESOPHAGITIS: ICD-10-CM

## 2025-06-11 DIAGNOSIS — T17.900A SILENT ASPIRATION, INITIAL ENCOUNTER: ICD-10-CM

## 2025-06-11 DIAGNOSIS — B37.0 THRUSH, ORAL: ICD-10-CM

## 2025-06-11 DIAGNOSIS — R19.7 ACUTE DIARRHEA: ICD-10-CM

## 2025-06-11 DIAGNOSIS — R93.3 ABNORMAL BARIUM SWALLOW: ICD-10-CM

## 2025-06-11 DIAGNOSIS — R13.14 PHARYNGOESOPHAGEAL DYSPHAGIA: ICD-10-CM

## 2025-06-11 PROCEDURE — 99214 OFFICE O/P EST MOD 30 MIN: CPT | Performed by: INTERNAL MEDICINE

## 2025-06-11 RX ORDER — AMLODIPINE BESYLATE 10 MG/1
TABLET ORAL
Refills: 0 | Status: ON HOLD | COMMUNITY
Start: 2013-06-04

## 2025-06-11 RX ORDER — ESOMEPRAZOLE MAGNESIUM 40 MG/1
1 CAPSULE CAPSULE, DELAYED RELEASE PELLETS ORAL ONCE A DAY
Status: ON HOLD | COMMUNITY

## 2025-06-11 RX ORDER — TRIAMTERENE AND HYDROCHLOROTHIAZIDE 37.5; 25 MG/1; MG/1
TABLET ORAL
Refills: 0 | Status: ON HOLD | COMMUNITY
Start: 2013-10-30

## 2025-06-11 RX ORDER — FLUTICASONE PROPIONATE 50 UG/1
SPRAY, METERED NASAL
Refills: 0 | Status: ACTIVE | COMMUNITY
Start: 2013-11-08

## 2025-06-11 RX ORDER — OMEPRAZOLE 40 MG/1
TAKE 1 CAPSULE DAILY CAPSULE, DELAYED RELEASE ORAL
Qty: 30 | Refills: 4 | Status: ACTIVE | COMMUNITY

## 2025-06-11 RX ORDER — ATORVASTATIN CALCIUM 40 MG/1
1 TABLET TABLET, FILM COATED ORAL ONCE A DAY
Status: ACTIVE | COMMUNITY

## 2025-06-11 RX ORDER — FERROUS SULFATE TAB 325 MG (65 MG ELEMENTAL FE) 325 (65 FE) MG
1 TABLET TAB ORAL
Status: ACTIVE | COMMUNITY

## 2025-06-11 RX ORDER — CHOLECALCIFEROL (VITAMIN D3) 50 MCG
1 CAPSULE CAPSULE ORAL ONCE A DAY
Status: ON HOLD | COMMUNITY

## 2025-06-11 RX ORDER — CYPROHEPTADINE HYDROCHLORIDE 4 MG/1
1 TABLET TABLET ORAL TWICE A DAY
Status: ACTIVE | COMMUNITY

## 2025-06-11 RX ORDER — OMEPRAZOLE 20 MG/1
CAPSULE, DELAYED RELEASE ORAL
Refills: 0 | Status: ON HOLD | COMMUNITY
Start: 2013-11-08

## 2025-06-11 RX ORDER — QUETIAPINE 50 MG/1
1 TABLET AT BEDTIME TABLET, FILM COATED ORAL DAILY
Status: ACTIVE | COMMUNITY

## 2025-06-11 RX ORDER — AZITHROMYCIN DIHYDRATE 250 MG/1
TABLET, FILM COATED ORAL
Refills: 0 | Status: ON HOLD | COMMUNITY
Start: 2013-05-13

## 2025-06-11 RX ORDER — ASPIRIN 81 MG/1
1 TABLET TABLET, COATED ORAL ONCE A DAY
Status: ON HOLD | COMMUNITY

## 2025-06-11 RX ORDER — SIMVASTATIN 40 MG/1
TABLET, FILM COATED ORAL
Refills: 0 | Status: ON HOLD | COMMUNITY
Start: 2013-05-06

## 2025-06-11 RX ORDER — AMOXICILLIN 500 MG/1
CAPSULE ORAL
Refills: 0 | Status: ON HOLD | COMMUNITY
Start: 2013-09-13

## 2025-06-11 RX ORDER — CETIRIZINE HYDROCHLORIDE 10 MG/1
1 TABLET TABLET, FILM COATED ORAL ONCE A DAY
Status: ON HOLD | COMMUNITY

## 2025-06-11 RX ORDER — TRIAMTERENE AND HYDROCHLOROTHIAZIDE 37.5; 25 MG/1; MG/1
CAPSULE ORAL
Refills: 0 | Status: ON HOLD | COMMUNITY
Start: 2013-06-04

## 2025-06-11 RX ORDER — TRIAMTERENE/HYDROCHLOROTHIAZID 37.5-25 MG
TAKE 1 CAPSULE DAILY CAPSULE ORAL
Refills: 0 | Status: ON HOLD | COMMUNITY

## 2025-06-11 RX ORDER — TRAZODONE HYDROCHLORIDE 50 MG/1
1 TABLET AT BEDTIME AS NEEDED TABLET ORAL DAILY
Status: ACTIVE | COMMUNITY

## 2025-06-11 RX ORDER — UBIDECARENONE 30 MG
AS DIRECTED CAPSULE ORAL
Status: ON HOLD | COMMUNITY

## 2025-06-11 RX ORDER — DOCUSATE SODIUM 100 MG/1
1 CAPSULE AS NEEDED CAPSULE ORAL ONCE A DAY
Status: ON HOLD | COMMUNITY

## 2025-06-11 RX ORDER — SERTRALINE 50 MG/1
1 TABLET TABLET, FILM COATED ORAL ONCE A DAY
Status: ACTIVE | COMMUNITY

## 2025-06-11 RX ORDER — AMLODIPINE BESYLATE 10 MG
TAKE 1 TABLET DAILY TABLET ORAL
Refills: 0 | Status: ON HOLD | COMMUNITY

## 2025-06-11 RX ORDER — SIMVASTATIN 20 MG/1
TAKE 1 TABLET DAILY AT BEDTIME TABLET, FILM COATED ORAL
Refills: 0 | Status: ON HOLD | COMMUNITY

## 2025-06-11 RX ORDER — NAPROXEN 500 MG/1
TABLET, DELAYED RELEASE ORAL
Refills: 0 | Status: ON HOLD | COMMUNITY
Start: 2013-04-09

## 2025-06-11 RX ORDER — PANTOPRAZOLE SODIUM 40 MG/1
1 TABLET TABLET, DELAYED RELEASE ORAL DAILY
Status: ACTIVE | COMMUNITY

## 2025-06-11 RX ORDER — TAMSULOSIN HYDROCHLORIDE 0.4 MG/1
1 CAPSULE CAPSULE ORAL ONCE A DAY
Status: ACTIVE | COMMUNITY

## 2025-06-11 RX ORDER — MONTELUKAST 10 MG/1
1 TABLET TABLET, FILM COATED ORAL DAILY
Status: ACTIVE | COMMUNITY

## 2025-06-11 RX ORDER — FLUCONAZOLE 100 MG/1
1 TABLET TABLET ORAL
Qty: 1 | OUTPATIENT
Start: 2025-06-11 | End: 2025-06-21

## 2025-06-11 RX ORDER — CARVEDILOL 12.5 MG/1
1 TABLET WITH FOOD TABLET, FILM COATED ORAL TWICE A DAY
Status: ON HOLD | COMMUNITY

## 2025-06-11 RX ORDER — OLMESARTAN MEDOXOMIL 40 MG/1
1 TABLET TABLET, FILM COATED ORAL ONCE A DAY
Status: ACTIVE | COMMUNITY

## 2025-06-11 NOTE — HPI-TODAY'S VISIT:
This is a 75-year-old male with a history of hypertension, hypercholesterolemia, dysphagia status post EGD demonstrating normal esophagus/empiric dilation (2022), and diarrhea presenting for follow-up.  He was last seen here on 1/16/25; he missed an  appointment on 4/3/25 as he was apaprently hospitalized at [facility] at that time.  He was previously seen 10/21/2024.  He was taking esomeprazole 40 mg or omeprazole 40 mg daily for acid reflux.  He denied heartburn or regurgitation.  He continues to report pharyngeal dysphagia.  Barium swallow had been aborted prior to completion showing silent aspiration.  A barium tablet passed through the esophagus without incident and there was mention of a patulous thoracic esophagus.  Discussed with Dr. Aiken who recommended a modified barium swallow with speech to assess for aspiration and esophageal manometry.  He continues to report more than 2 watery bowel movements per day.  He had been taking 1 sleeve of Benefiber intermittently.  At a previous visit, stool studies were ordered but had not been completed.  He was to complete stool studies and increase Benefiber to 2 sleeves every day.  There are no results for esophageal manometry or modified barium swallow.  It does not appear as though orders have been faxed.  At his 1/16/25 OV he reported persistent difficulty swallowing describing solid food lodging in the area at the base of his neck.  He denied choking or coughing when swallowing.  He was not eating food but was drinking Ensure.  He denied heartburn, abdominal pain, or nausea.  He had a bowel movement most days.  He occasionally required Colace for constipation.  He denied other abdominal symptoms.  He had gained 7-1/2 pounds since his prior visit.  His great niece accompanied him.  She assisted with his history and reported recent labs showing diabetes and kidney disease.  The patient was scheduled for a repeat modified barium swallow at his 1/16/25 OV. He missed a f/u visit with me on 4/3/25 and I have no record that these studies were ever done.  He tells me that he is eating well, but his significant other says he "only eats spaghetti."  He has not had the modified barium swallow yet.  He is a poor historian.  He is having loose stools, and did receive antibiotics during her recent hospitalization.

## 2025-06-11 NOTE — EXAM-PHYSICAL EXAM
General- no acute distress Eyes- anicteric HENT- normocephalic, atraumatic head; thrush noted Neck- no lymphadenopathy Chest- normal breath sounds Heart- regular rate and rhythm Abdomen- soft, non tender, non distended, bowel sounds present Musculoskeletal- normal gait and station Skin- no rashes Neurologic- Alert and oriented x 3 Psychiatric- stable mood, appropriate affect

## 2025-06-18 ENCOUNTER — P2P PATIENT RECORD (OUTPATIENT)
Age: 75
End: 2025-06-18